# Patient Record
Sex: MALE | Race: WHITE | URBAN - METROPOLITAN AREA
[De-identification: names, ages, dates, MRNs, and addresses within clinical notes are randomized per-mention and may not be internally consistent; named-entity substitution may affect disease eponyms.]

---

## 2023-03-17 DIAGNOSIS — M10.9 GOUT, UNSPECIFIED CAUSE, UNSPECIFIED CHRONICITY, UNSPECIFIED SITE: ICD-10-CM

## 2023-03-19 PROBLEM — M10.9 GOUT: Status: ACTIVE | Noted: 2023-03-19

## 2023-03-20 RX ORDER — COLCHICINE 0.6 MG/1
TABLET ORAL
Qty: 90 TABLET | Refills: 0 | Status: SHIPPED | OUTPATIENT
Start: 2023-03-20 | End: 2023-03-20 | Stop reason: SDUPTHER

## 2023-03-20 RX ORDER — COLCHICINE 0.6 MG/1
0.6 TABLET ORAL DAILY
Qty: 90 TABLET | Refills: 1 | Status: SHIPPED | OUTPATIENT
Start: 2023-03-20 | End: 2024-02-06

## 2023-05-09 ENCOUNTER — LAB (OUTPATIENT)
Dept: LAB | Facility: LAB | Age: 63
End: 2023-05-09
Payer: COMMERCIAL

## 2023-05-09 ENCOUNTER — OFFICE VISIT (OUTPATIENT)
Dept: PRIMARY CARE | Facility: CLINIC | Age: 63
End: 2023-05-09
Payer: COMMERCIAL

## 2023-05-09 VITALS
SYSTOLIC BLOOD PRESSURE: 129 MMHG | OXYGEN SATURATION: 98 % | HEIGHT: 70 IN | WEIGHT: 166 LBS | TEMPERATURE: 97.4 F | BODY MASS INDEX: 23.77 KG/M2 | DIASTOLIC BLOOD PRESSURE: 81 MMHG | HEART RATE: 66 BPM

## 2023-05-09 DIAGNOSIS — M10.9 GOUT, UNSPECIFIED CAUSE, UNSPECIFIED CHRONICITY, UNSPECIFIED SITE: ICD-10-CM

## 2023-05-09 DIAGNOSIS — Z00.00 VISIT FOR PREVENTIVE HEALTH EXAMINATION: Primary | ICD-10-CM

## 2023-05-09 DIAGNOSIS — Z12.11 SCREENING FOR MALIGNANT NEOPLASM OF COLON: ICD-10-CM

## 2023-05-09 DIAGNOSIS — Z00.00 VISIT FOR PREVENTIVE HEALTH EXAMINATION: ICD-10-CM

## 2023-05-09 LAB
ALANINE AMINOTRANSFERASE (SGPT) (U/L) IN SER/PLAS: 21 U/L (ref 10–52)
ALBUMIN (G/DL) IN SER/PLAS: 4.4 G/DL (ref 3.4–5)
ALKALINE PHOSPHATASE (U/L) IN SER/PLAS: 59 U/L (ref 33–136)
ANION GAP IN SER/PLAS: 14 MMOL/L (ref 10–20)
ASPARTATE AMINOTRANSFERASE (SGOT) (U/L) IN SER/PLAS: 24 U/L (ref 9–39)
BILIRUBIN TOTAL (MG/DL) IN SER/PLAS: 1.4 MG/DL (ref 0–1.2)
CALCIUM (MG/DL) IN SER/PLAS: 9.7 MG/DL (ref 8.6–10.6)
CARBON DIOXIDE, TOTAL (MMOL/L) IN SER/PLAS: 30 MMOL/L (ref 21–32)
CHLORIDE (MMOL/L) IN SER/PLAS: 104 MMOL/L (ref 98–107)
CHOLESTEROL (MG/DL) IN SER/PLAS: 194 MG/DL (ref 0–199)
CHOLESTEROL IN HDL (MG/DL) IN SER/PLAS: 49 MG/DL
CHOLESTEROL/HDL RATIO: 4
CREATININE (MG/DL) IN SER/PLAS: 1.18 MG/DL (ref 0.5–1.3)
ERYTHROCYTE DISTRIBUTION WIDTH (RATIO) BY AUTOMATED COUNT: 12.6 % (ref 11.5–14.5)
ERYTHROCYTE MEAN CORPUSCULAR HEMOGLOBIN CONCENTRATION (G/DL) BY AUTOMATED: 33 G/DL (ref 32–36)
ERYTHROCYTE MEAN CORPUSCULAR VOLUME (FL) BY AUTOMATED COUNT: 93 FL (ref 80–100)
ERYTHROCYTES (10*6/UL) IN BLOOD BY AUTOMATED COUNT: 4.92 X10E12/L (ref 4.5–5.9)
GFR MALE: 69 ML/MIN/1.73M2
GLUCOSE (MG/DL) IN SER/PLAS: 84 MG/DL (ref 74–99)
HEMATOCRIT (%) IN BLOOD BY AUTOMATED COUNT: 46 % (ref 41–52)
HEMOGLOBIN (G/DL) IN BLOOD: 15.2 G/DL (ref 13.5–17.5)
LDL: 119 MG/DL (ref 0–99)
LEUKOCYTES (10*3/UL) IN BLOOD BY AUTOMATED COUNT: 5.9 X10E9/L (ref 4.4–11.3)
NRBC (PER 100 WBCS) BY AUTOMATED COUNT: 0 /100 WBC (ref 0–0)
PLATELETS (10*3/UL) IN BLOOD AUTOMATED COUNT: 212 X10E9/L (ref 150–450)
POTASSIUM (MMOL/L) IN SER/PLAS: 4.4 MMOL/L (ref 3.5–5.3)
PROSTATE SPECIFIC ANTIGEN,SCREEN: 0.49 NG/ML (ref 0–4)
PROTEIN TOTAL: 6.6 G/DL (ref 6.4–8.2)
RBC, URINE: 1 /HPF (ref 0–5)
SODIUM (MMOL/L) IN SER/PLAS: 144 MMOL/L (ref 136–145)
THYROTROPIN (MIU/L) IN SER/PLAS BY DETECTION LIMIT <= 0.05 MIU/L: 3.62 MIU/L (ref 0.44–3.98)
TRIGLYCERIDE (MG/DL) IN SER/PLAS: 132 MG/DL (ref 0–149)
URATE (MG/DL) IN SER/PLAS: 7.4 MG/DL (ref 4–7.5)
UREA NITROGEN (MG/DL) IN SER/PLAS: 19 MG/DL (ref 6–23)
VLDL: 26 MG/DL (ref 0–40)
WBC, URINE: <1 /HPF (ref 0–5)

## 2023-05-09 PROCEDURE — 99213 OFFICE O/P EST LOW 20 MIN: CPT | Performed by: FAMILY MEDICINE

## 2023-05-09 PROCEDURE — 84443 ASSAY THYROID STIM HORMONE: CPT

## 2023-05-09 PROCEDURE — 99396 PREV VISIT EST AGE 40-64: CPT | Performed by: FAMILY MEDICINE

## 2023-05-09 PROCEDURE — 36415 COLL VENOUS BLD VENIPUNCTURE: CPT

## 2023-05-09 PROCEDURE — 80053 COMPREHEN METABOLIC PANEL: CPT

## 2023-05-09 PROCEDURE — 84550 ASSAY OF BLOOD/URIC ACID: CPT

## 2023-05-09 PROCEDURE — 85027 COMPLETE CBC AUTOMATED: CPT

## 2023-05-09 PROCEDURE — 84153 ASSAY OF PSA TOTAL: CPT

## 2023-05-09 PROCEDURE — 80061 LIPID PANEL: CPT

## 2023-05-09 PROCEDURE — 1036F TOBACCO NON-USER: CPT | Performed by: FAMILY MEDICINE

## 2023-05-09 PROCEDURE — 81001 URINALYSIS AUTO W/SCOPE: CPT

## 2023-05-09 RX ORDER — INDOMETHACIN 25 MG/1
1 CAPSULE ORAL 3 TIMES DAILY
COMMUNITY
Start: 2022-06-01

## 2023-05-09 RX ORDER — PREDNISONE 20 MG/1
20 TABLET ORAL DAILY
Qty: 10 TABLET | Refills: 1 | Status: SHIPPED | OUTPATIENT
Start: 2023-05-09 | End: 2023-05-19

## 2023-05-09 RX ORDER — MELATONIN 3 MG
CAPSULE ORAL
COMMUNITY

## 2023-05-09 ASSESSMENT — LIFESTYLE VARIABLES
HOW OFTEN DO YOU HAVE A DRINK CONTAINING ALCOHOL: NEVER
SKIP TO QUESTIONS 9-10: 1
AUDIT-C TOTAL SCORE: 0
HOW MANY STANDARD DRINKS CONTAINING ALCOHOL DO YOU HAVE ON A TYPICAL DAY: PATIENT DOES NOT DRINK
HOW OFTEN DO YOU HAVE SIX OR MORE DRINKS ON ONE OCCASION: NEVER

## 2023-05-09 NOTE — PROGRESS NOTES
"Subjective   Patient ID: Rico Peng is a 62 y.o. male who presents for Gout and Insomnia.    Assessment/Plan     Problem List Items Addressed This Visit          Other    Gout    Relevant Medications    predniSONE (Deltasone) 20 mg tablet    Other Relevant Orders    Uric acid    Visit for preventive health examination - Primary    Relevant Orders    Prostate Specific Antigen, Screen    Urinalysis Microscopic Only    TSH with reflex to Free T4 if abnormal    Lipid Panel    Comprehensive Metabolic Panel    CBC    Uric acid    CT cardiac scoring wo IV contrast     Other Visit Diagnoses       Screening for malignant neoplasm of colon        Relevant Orders    Cologuard® colon cancer screening            discussed about Diet exercise  Colonoscopy declined -Cologuard negative in December 2018  Cologuard today  Discussed about shingles vaccine  Calcium score ordered today  Follow-up every year for physical. Come back early with any new sinus symptoms. Patient understands and in agreement with plan.   HPI    62-year-old male here here for physical complaining of sleep disturbance  Taking melatonin helping    Gout on colchicine    No significant past medical history surgical history no smoking alcohol or drug use    Doing routine exercise without any trouble    No Known Allergies    Current Outpatient Medications   Medication Sig Dispense Refill    colchicine 0.6 mg tablet Take 1 tablet (0.6 mg) by mouth once daily. 90 tablet 1    melatonin 3 mg capsule Take by mouth.      indomethacin (Indocin) 25 mg capsule Take 1 capsule (25 mg) by mouth 3 times a day.      predniSONE (Deltasone) 20 mg tablet Take 1 tablet (20 mg) by mouth once daily for 10 days. 10 tablet 1     No current facility-administered medications for this visit.       Objective   Visit Vitals  /81 (BP Location: Left arm, Patient Position: Sitting)   Pulse 66   Temp 36.3 °C (97.4 °F)   Ht 1.778 m (5' 10\")   Wt 75.3 kg (166 lb)   SpO2 98%   BMI 23.82 " kg/m²   Smoking Status Never   BSA 1.93 m²     Physical Exam    Constitutional   General appearance: Alert and in no acute distress.   Head and Face   Head and face: Normal.     Palpation of the face and sinuses: Normal.    Eyes   Inspection of eyes: Sclera and conjunctiva were normal.    Pupil exam: Pupils were equal in size. Extraocular movements were intact.   Ears, Nose, Mouth, and Throat   Ears: Auricles: Normal.    Otoscopic examination: Tympanic membranes: Normal with no congestion and no discharge. Otic Canals: Normal without tenderness, congestion or discharge.    Hearing: Normal.     Nasal mucosa, septum, and turbinates: Normal without edema or erythema.    Lips, teeth, and gums: Normal.    Oropharynx: Normal with moist mucus membranes, no congestion. Tonsils: Normal no follicles.   Neck   Neck Exam: Appearance of the neck was normal. No neck masses observed.    Thyroid exam: Not enlarged and no palpable thyroid nodules.   Pulmonary   Respiratory assessment: No respiratory distress, normal respiratory rhythm and effort.    Auscultation of Lungs: Clear bilateral breath sounds.   Cardiovascular   Auscultation of heart: Apical pulse normal, heart rate and rhythm normal, normal S1 and S2, no murmurs and no pericardial rub.    Carotid arteries: Pulses normal with no bruits.    Exam for edema: No peripheral edema.   Chest   Chest: Normal A_P diameter, no pulsation, no intercostal withdrawing. Trachea central.   Abdomen   Abdominal Exam: No bruits, normal bowel sounds, soft, non-tender, no abdominal mass palpated.    Liver and Spleen exam: No hepato-splenomegaly.    Examination for hernias: Normal.    Lymphatic   Palpation of lymph nodes in neck: No cervical lymphadenopathy.   Musculoskeletal   Examination of gait: Normal.    Inspection of digits and nails: No clubbing or cyanosis of the fingernails.    Inspection/palpation of joints, bones and muscles: No joint swelling. Normal movement of all extremities.     Range of Motion: Normal movement of all extremities.   Skin   Skin inspection: Normal skin color and pigmentation, normal skin turgor and no visible rash.   Neurologic   Cranial nerves: Nerves 2-12 were intact, no focal neuro defects.    Reflexes: Normal.     Sensation: Normal.     Coordination: Normal.    Psychiatric   Judgment and insight: Intact.    Orientation: Oriented to person, place, and time.    Recent and remote memory: Normal.     Mood and affect: Normal.     Genitourinary Declined.    Immunization History   Administered Date(s) Administered    Pfizer Purple Cap SARS-CoV-2 04/02/2021, 04/23/2021       Review of Systems    No visits with results within 4 Month(s) from this visit.   Latest known visit with results is:   Legacy Encounter on 11/16/2018   Component Date Value Ref Range Status    WBC 11/16/2018 5.4  4.4 - 11.3 x10E9/L Final    nRBC 11/16/2018 0.0  0.0 - 0.0 /100 WBC Final    RBC 11/16/2018 5.14  4.50 - 5.90 x10E12/L Final    Hemoglobin 11/16/2018 16.2  13.5 - 17.5 g/dL Final    Hematocrit 11/16/2018 47.0  41.0 - 52.0 % Final    MCV 11/16/2018 91  80 - 100 fL Final    MCHC 11/16/2018 34.5  32.0 - 36.0 g/dL Final    Platelets 11/16/2018 208  150 - 450 x10E9/L Final    RDW 11/16/2018 12.2  11.5 - 14.5 % Final    Vitamin D, 25-Hydroxy 11/16/2018 23 (A)  ng/mL Final    Uric Acid 11/16/2018 8.6 (H)  4.0 - 7.5 mg/dL Final    Color, Urine 11/16/2018 YELLOW  STRAW,YELLOW Final    Appearance, Urine 11/16/2018 CLEAR  CLEAR Final    Specific Gravity, Urine 11/16/2018 1.009  1.005 - 1.035 Final    pH, Urine 11/16/2018 6.0  5.0 - 8.0 Final    Protein, Urine 11/16/2018 NEGATIVE  NEGATIVE mg/dL Final    Glucose, Urine 11/16/2018 NEGATIVE  NEGATIVE mg/dL Final    Blood, Urine 11/16/2018 NEGATIVE  NEGATIVE Final    Ketones, Urine 11/16/2018 NEGATIVE  NEGATIVE mg/dL Final    Bilirubin, Urine 11/16/2018 NEGATIVE  NEGATIVE Final    Urobilinogen, Urine 11/16/2018 <2.0  0.0 - 1.9 mg/dL Final    Nitrite, Urine  11/16/2018 NEGATIVE  NEGATIVE Final    Leukocyte Esterase, Urine 11/16/2018 NEGATIVE  NEGATIVE Final    Glucose 11/16/2018 91  74 - 99 mg/dL Final    Sodium 11/16/2018 139  136 - 145 mmol/L Final    Potassium 11/16/2018 4.6  3.5 - 5.3 mmol/L Final    Chloride 11/16/2018 101  98 - 107 mmol/L Final    Bicarbonate 11/16/2018 30  21 - 32 mmol/L Final    Anion Gap 11/16/2018 13  10 - 20 mmol/L Final    Urea Nitrogen 11/16/2018 16  6 - 23 mg/dL Final    Creatinine 11/16/2018 1.16  0.50 - 1.30 mg/dL Final    GLOMERULAR FILTRATION RATE-NON AFR* 11/16/2018 >60  >60 mL/min/1.73m2 Final    GLOMERULAR FILTRATION RATE-* 11/16/2018 >60  >60 mL/min/1.73m2 Final    Calcium 11/16/2018 9.9  8.6 - 10.6 mg/dL Final    Albumin 11/16/2018 4.6  3.4 - 5.0 g/dL Final    Alkaline Phosphatase 11/16/2018 63  33 - 120 U/L Final    Total Protein 11/16/2018 6.7  6.4 - 8.2 g/dL Final    AST 11/16/2018 19  9 - 39 U/L Final    Total Bilirubin 11/16/2018 0.7  0.0 - 1.2 mg/dL Final    ALT (SGPT) 11/16/2018 17  10 - 52 U/L Final    PSA 11/16/2018 0.55  0.00 - 4.00 ng/mL Final    Cholesterol 11/16/2018 229 (H)  0 - 199 mg/dL Final    HDL 11/16/2018 44.9  mg/dL Final    Cholesterol/HDL Ratio 11/16/2018 5.1 (A)   Final    LDL 11/16/2018 139 (H)  0 - 99 mg/dL Final    VLDL 11/16/2018 45 (H)  0 - 40 mg/dL Final    Triglycerides 11/16/2018 226 (H)  0 - 149 mg/dL Final    Non HDL Cholesterol 11/16/2018 184  mg/dL Final    TSH 11/16/2018 2.51  0.44 - 3.98 mIU/L Final       Radiology: Reviewed imaging in powerchart.  No results found.    No family history on file.  Social History     Socioeconomic History    Marital status:      Spouse name: None    Number of children: None    Years of education: None    Highest education level: None   Occupational History    None   Tobacco Use    Smoking status: Never    Smokeless tobacco: Never   Vaping Use    Vaping status: None   Substance and Sexual Activity    Alcohol use: Never    Drug use: Never    Sexual  activity: None   Other Topics Concern    None   Social History Narrative    None     Social Determinants of Health     Financial Resource Strain: Not on file   Food Insecurity: Not on file   Transportation Needs: Not on file   Physical Activity: Not on file   Stress: Not on file   Social Connections: Not on file   Intimate Partner Violence: Not on file   Housing Stability: Not on file     Past Medical History:   Diagnosis Date    Other conditions influencing health status     No significant past surgical history    Other specified soft tissue disorders 08/19/2015    Foot swelling    Pain in unspecified foot 08/19/2015    Foot pain     History reviewed. No pertinent surgical history.    Charting was completed using voice recognition technology and may include unintended errors.

## 2023-05-10 ENCOUNTER — TELEPHONE (OUTPATIENT)
Dept: PRIMARY CARE | Facility: CLINIC | Age: 63
End: 2023-05-10

## 2023-05-10 NOTE — TELEPHONE ENCOUNTER
----- Message from Kenji Cox MD sent at 5/10/2023  9:47 AM EDT -----  Please call the patient regarding his abnormal result.  Patient's cholesterol has improved but still high we will wait for CT calcium score and see whether patient needs to be on statin.  Meanwhile continue low-carb diet physical activity

## 2023-05-25 ENCOUNTER — TELEPHONE (OUTPATIENT)
Dept: PRIMARY CARE | Facility: CLINIC | Age: 63
End: 2023-05-25
Payer: COMMERCIAL

## 2023-11-28 ENCOUNTER — APPOINTMENT (OUTPATIENT)
Dept: PRIMARY CARE | Facility: CLINIC | Age: 63
End: 2023-11-28
Payer: COMMERCIAL

## 2023-11-29 ENCOUNTER — APPOINTMENT (OUTPATIENT)
Dept: PRIMARY CARE | Facility: CLINIC | Age: 63
End: 2023-11-29
Payer: COMMERCIAL

## 2024-02-01 DIAGNOSIS — M10.9 GOUT, UNSPECIFIED CAUSE, UNSPECIFIED CHRONICITY, UNSPECIFIED SITE: ICD-10-CM

## 2024-02-06 DIAGNOSIS — M10.9 GOUT, UNSPECIFIED CAUSE, UNSPECIFIED CHRONICITY, UNSPECIFIED SITE: ICD-10-CM

## 2024-02-06 RX ORDER — COLCHICINE 0.6 MG/1
0.6 TABLET ORAL DAILY
Qty: 90 TABLET | Refills: 0 | Status: SHIPPED | OUTPATIENT
Start: 2024-02-06 | End: 2024-02-09 | Stop reason: SDUPTHER

## 2024-02-07 RX ORDER — COLCHICINE 0.6 MG/1
0.6 TABLET ORAL DAILY
Qty: 90 TABLET | Refills: 0 | OUTPATIENT
Start: 2024-02-07

## 2024-02-09 RX ORDER — COLCHICINE 0.6 MG/1
0.6 TABLET ORAL DAILY
Qty: 90 TABLET | Refills: 1 | Status: SHIPPED | OUTPATIENT
Start: 2024-02-09 | End: 2024-02-12 | Stop reason: SDUPTHER

## 2024-02-12 ENCOUNTER — TELEPHONE (OUTPATIENT)
Dept: PRIMARY CARE | Facility: CLINIC | Age: 64
End: 2024-02-12
Payer: COMMERCIAL

## 2024-02-12 DIAGNOSIS — M10.9 GOUT, UNSPECIFIED CAUSE, UNSPECIFIED CHRONICITY, UNSPECIFIED SITE: ICD-10-CM

## 2024-02-12 RX ORDER — COLCHICINE 0.6 MG/1
0.6 TABLET ORAL DAILY
Qty: 90 TABLET | Refills: 1 | Status: SHIPPED | OUTPATIENT
Start: 2024-02-12

## 2024-02-12 NOTE — TELEPHONE ENCOUNTER
LEFT COLCHICINE IN SONS CAR IN [place]. SOMEHOW IT DISAPPEARED. NEEDS TO HAVE A REFILL.     [facility]

## 2024-12-04 DIAGNOSIS — M10.9 GOUT, UNSPECIFIED CAUSE, UNSPECIFIED CHRONICITY, UNSPECIFIED SITE: ICD-10-CM

## 2024-12-05 RX ORDER — COLCHICINE 0.6 MG/1
0.6 TABLET ORAL DAILY
Qty: 30 TABLET | Refills: 0 | Status: SHIPPED | OUTPATIENT
Start: 2024-12-05 | End: 2025-01-04

## 2025-01-16 DIAGNOSIS — M10.9 GOUT, UNSPECIFIED CAUSE, UNSPECIFIED CHRONICITY, UNSPECIFIED SITE: ICD-10-CM

## 2025-01-20 RX ORDER — COLCHICINE 0.6 MG/1
0.6 TABLET ORAL DAILY
Qty: 90 TABLET | Refills: 2 | Status: SHIPPED | OUTPATIENT
Start: 2025-01-20 | End: 2026-01-20

## 2025-01-21 ENCOUNTER — TELEPHONE (OUTPATIENT)
Dept: PRIMARY CARE | Facility: CLINIC | Age: 65
End: 2025-01-21
Payer: COMMERCIAL

## 2025-01-21 DIAGNOSIS — M10.9 GOUT, UNSPECIFIED CAUSE, UNSPECIFIED CHRONICITY, UNSPECIFIED SITE: ICD-10-CM

## 2025-01-21 RX ORDER — COLCHICINE 0.6 MG/1
0.6 TABLET ORAL DAILY
Qty: 30 TABLET | Refills: 0 | OUTPATIENT
Start: 2025-01-21

## 2025-01-24 NOTE — TELEPHONE ENCOUNTER
Please send colchcine  to [facility]. Patient can't use [facility]    Patient is asking for 90 days of med

## 2025-01-27 RX ORDER — COLCHICINE 0.6 MG/1
0.6 TABLET ORAL DAILY
Qty: 14 TABLET | Refills: 0 | Status: SHIPPED | OUTPATIENT
Start: 2025-01-27 | End: 2025-02-10

## 2025-01-27 NOTE — TELEPHONE ENCOUNTER
PT has been waiting for this med because of gout.  I see it is up for the Dr to sign off.  PT was told to make an appointment and he did so.      Please get this med sent today to [facility].

## 2025-02-07 ENCOUNTER — APPOINTMENT (OUTPATIENT)
Dept: PRIMARY CARE | Facility: CLINIC | Age: 65
End: 2025-02-07
Payer: COMMERCIAL

## 2025-02-07 VITALS
DIASTOLIC BLOOD PRESSURE: 62 MMHG | TEMPERATURE: 98.2 F | SYSTOLIC BLOOD PRESSURE: 111 MMHG | BODY MASS INDEX: 24.48 KG/M2 | OXYGEN SATURATION: 93 % | HEART RATE: 73 BPM | WEIGHT: 171 LBS | HEIGHT: 70 IN

## 2025-02-07 DIAGNOSIS — Z12.11 SCREENING FOR MALIGNANT NEOPLASM OF COLON: ICD-10-CM

## 2025-02-07 DIAGNOSIS — M10.9 GOUT, UNSPECIFIED CAUSE, UNSPECIFIED CHRONICITY, UNSPECIFIED SITE: ICD-10-CM

## 2025-02-07 DIAGNOSIS — Z00.00 VISIT FOR PREVENTIVE HEALTH EXAMINATION: Primary | ICD-10-CM

## 2025-02-07 PROCEDURE — 90471 IMMUNIZATION ADMIN: CPT | Performed by: FAMILY MEDICINE

## 2025-02-07 PROCEDURE — 3008F BODY MASS INDEX DOCD: CPT | Performed by: FAMILY MEDICINE

## 2025-02-07 PROCEDURE — 1160F RVW MEDS BY RX/DR IN RCRD: CPT | Performed by: FAMILY MEDICINE

## 2025-02-07 PROCEDURE — 90677 PCV20 VACCINE IM: CPT | Performed by: FAMILY MEDICINE

## 2025-02-07 PROCEDURE — 1036F TOBACCO NON-USER: CPT | Performed by: FAMILY MEDICINE

## 2025-02-07 PROCEDURE — 1159F MED LIST DOCD IN RCRD: CPT | Performed by: FAMILY MEDICINE

## 2025-02-07 PROCEDURE — 99397 PER PM REEVAL EST PAT 65+ YR: CPT | Performed by: FAMILY MEDICINE

## 2025-02-07 RX ORDER — VIT C/E/ZN/COPPR/LUTEIN/ZEAXAN 250MG-90MG
CAPSULE ORAL
COMMUNITY
Start: 2023-06-07

## 2025-02-07 RX ORDER — COLCHICINE 0.6 MG/1
0.6 TABLET ORAL DAILY
Qty: 90 TABLET | Refills: 3 | Status: SHIPPED | OUTPATIENT
Start: 2025-02-07 | End: 2026-02-07

## 2025-02-07 NOTE — PROGRESS NOTES
Subjective   Patient ID: Rico Peng is a 64 y.o. male who presents for Annual Exam (Pt is fasting /Declined flu shot).    Assessment/Plan     Problem List Items Addressed This Visit       Gout    Relevant Medications    colchicine 0.6 mg tablet    Other Relevant Orders    Uric acid    Visit for preventive health examination - Primary    Relevant Orders    Prostate Specific Antigen, Screen    TSH with reflex to Free T4 if abnormal    Lipid Panel    Comprehensive Metabolic Panel    CBC    Urinalysis with Reflex Microscopic    Cologuard® colon cancer screening    Pneumococcal conjugate vaccine, 20-valent (PREVNAR 20) (Completed)     Other Visit Diagnoses       Screening for malignant neoplasm of colon        Relevant Orders    Cologuard® colon cancer screening          Prevnar 20 today  discussed about Diet exercise  Colonoscopy declined -Cologuard negative in December 2018  Cologuard today  Discussed about shingles vaccine  Follow-up every year for physical. Come back early with any new sinus symptoms. Patient understands and in agreement with plan.       HPI    64-year-old male here here for physical     No new sudden symptoms  Fasting for lab work today      Gout on colchicine    No significant past medical history surgical history no smoking alcohol or drug use    Doing routine exercise without any trouble    No Known Allergies    Current Outpatient Medications   Medication Sig Dispense Refill   • cholecalciferol (Vitamin D-3) 125 mcg (5000 UT) capsule Take by mouth.     • melatonin 3 mg capsule Take by mouth.     • colchicine 0.6 mg tablet Take 1 tablet (0.6 mg) by mouth once daily. 90 tablet 3   • indomethacin (Indocin) 25 mg capsule Take 1 capsule (25 mg) by mouth 3 times a day. (Patient not taking: Reported on 2/7/2025)       No current facility-administered medications for this visit.       Objective   Visit Vitals  /62 (BP Location: Left arm, Patient Position: Sitting)   Pulse 73   Temp 36.8 °C  "(98.2 °F)   Ht 1.778 m (5' 10\")   Wt 77.6 kg (171 lb)   SpO2 93%   BMI 24.54 kg/m²   Smoking Status Never   BSA 1.96 m²     Physical Exam    Constitutional   General appearance: Alert and in no acute distress.   Head and Face   Head and face: Normal.     Palpation of the face and sinuses: Normal.    Eyes   Inspection of eyes: Sclera and conjunctiva were normal.    Pupil exam: Pupils were equal in size. Extraocular movements were intact.   Ears, Nose, Mouth, and Throat   Ears: Auricles: Normal.    Otoscopic examination: Tympanic membranes: Normal with no congestion and no discharge. Otic Canals: Normal without tenderness, congestion or discharge.    Hearing: Normal.     Nasal mucosa, septum, and turbinates: Normal without edema or erythema.    Lips, teeth, and gums: Normal.    Oropharynx: Normal with moist mucus membranes, no congestion. Tonsils: Normal no follicles.   Neck   Neck Exam: Appearance of the neck was normal. No neck masses observed.    Thyroid exam: Not enlarged and no palpable thyroid nodules.   Pulmonary   Respiratory assessment: No respiratory distress, normal respiratory rhythm and effort.    Auscultation of Lungs: Clear bilateral breath sounds.   Cardiovascular   Auscultation of heart: Apical pulse normal, heart rate and rhythm normal, normal S1 and S2, no murmurs and no pericardial rub.    Carotid arteries: Pulses normal with no bruits.    Exam for edema: No peripheral edema.   Chest   Chest: Normal A_P diameter, no pulsation, no intercostal withdrawing. Trachea central.   Abdomen   Abdominal Exam: No bruits, normal bowel sounds, soft, non-tender, no abdominal mass palpated.    Liver and Spleen exam: No hepato-splenomegaly.    Examination for hernias: Normal.    Lymphatic   Palpation of lymph nodes in neck: No cervical lymphadenopathy.   Musculoskeletal   Examination of gait: Normal.    Inspection of digits and nails: No clubbing or cyanosis of the fingernails.    Inspection/palpation of joints, " bones and muscles: No joint swelling. Normal movement of all extremities.    Range of Motion: Normal movement of all extremities.   Skin   Skin inspection: Normal skin color and pigmentation, normal skin turgor and no visible rash.   Neurologic   Cranial nerves: Nerves 2-12 were intact, no focal neuro defects.    Reflexes: Normal.     Sensation: Normal.     Coordination: Normal.    Psychiatric   Judgment and insight: Intact.    Orientation: Oriented to person, place, and time.    Recent and remote memory: Normal.     Mood and affect: Normal.     Genitourinary Declined.    Immunization History   Administered Date(s) Administered   • COVID-19, mRNA, LNP-S, PF, 30 mcg/0.3 mL dose 04/02/2021, 04/23/2021   • Pfizer COVID-19 vaccine, bivalent, age 12 years and older (30 mcg/0.3 mL) 09/15/2022   • Pneumococcal conjugate vaccine, 20-valent (PREVNAR 20) 02/07/2025       Review of Systems    No visits with results within 4 Month(s) from this visit.   Latest known visit with results is:   Lab on 05/09/2023   Component Date Value Ref Range Status   • Prostate Specific Antigen,Screen 05/09/2023 0.49  0.00 - 4.00 ng/mL Final   • WBC, Urine 05/09/2023 <1  0 - 5 /HPF Final   • RBC, Urine 05/09/2023 1  0 - 5 /HPF Final   • TSH 05/09/2023 3.62  0.44 - 3.98 mIU/L Final   • Cholesterol 05/09/2023 194  0 - 199 mg/dL Final   • HDL 05/09/2023 49.0  mg/dL Final   • Cholesterol/HDL Ratio 05/09/2023 4.0   Final   • LDL 05/09/2023 119 (H)  0 - 99 mg/dL Final   • VLDL 05/09/2023 26  0 - 40 mg/dL Final   • Triglycerides 05/09/2023 132  0 - 149 mg/dL Final   • Glucose 05/09/2023 84  74 - 99 mg/dL Final   • Sodium 05/09/2023 144  136 - 145 mmol/L Final   • Potassium 05/09/2023 4.4  3.5 - 5.3 mmol/L Final   • Chloride 05/09/2023 104  98 - 107 mmol/L Final   • Bicarbonate 05/09/2023 30  21 - 32 mmol/L Final   • Anion Gap 05/09/2023 14  10 - 20 mmol/L Final   • Urea Nitrogen 05/09/2023 19  6 - 23 mg/dL Final   • Creatinine 05/09/2023 1.18  0.50 -  1.30 mg/dL Final   • GFR MALE 05/09/2023 69  >90 mL/min/1.73m2 Final   • Calcium 05/09/2023 9.7  8.6 - 10.6 mg/dL Final   • Albumin 05/09/2023 4.4  3.4 - 5.0 g/dL Final   • Alkaline Phosphatase 05/09/2023 59  33 - 136 U/L Final   • Total Protein 05/09/2023 6.6  6.4 - 8.2 g/dL Final   • AST 05/09/2023 24  9 - 39 U/L Final   • Total Bilirubin 05/09/2023 1.4 (H)  0.0 - 1.2 mg/dL Final   • ALT (SGPT) 05/09/2023 21  10 - 52 U/L Final   • WBC 05/09/2023 5.9  4.4 - 11.3 x10E9/L Final   • nRBC 05/09/2023 0.0  0.0 - 0.0 /100 WBC Final   • RBC 05/09/2023 4.92  4.50 - 5.90 x10E12/L Final   • Hemoglobin 05/09/2023 15.2  13.5 - 17.5 g/dL Final   • Hematocrit 05/09/2023 46.0  41.0 - 52.0 % Final   • MCV 05/09/2023 93  80 - 100 fL Final   • MCHC 05/09/2023 33.0  32.0 - 36.0 g/dL Final   • Platelets 05/09/2023 212  150 - 450 x10E9/L Final   • RDW 05/09/2023 12.6  11.5 - 14.5 % Final   • Uric Acid 05/09/2023 7.4  4.0 - 7.5 mg/dL Final       Radiology: Reviewed imaging in powerchart.  No results found.    No family history on file.  Social History     Socioeconomic History   • Marital status:    Tobacco Use   • Smoking status: Never   • Smokeless tobacco: Never   Substance and Sexual Activity   • Alcohol use: Never   • Drug use: Never     Past Medical History:   Diagnosis Date   • Other conditions influencing health status     No significant past surgical history   • Other specified soft tissue disorders 08/19/2015    Foot swelling   • Pain in unspecified foot 08/19/2015    Foot pain     History reviewed. No pertinent surgical history.    Charting was completed using voice recognition technology and may include unintended errors.

## 2025-02-08 LAB
ALBUMIN SERPL-MCNC: 4.6 G/DL (ref 3.6–5.1)
ALP SERPL-CCNC: 59 U/L (ref 35–144)
ALT SERPL-CCNC: 22 U/L (ref 9–46)
ANION GAP SERPL CALCULATED.4IONS-SCNC: 7 MMOL/L (CALC) (ref 7–17)
APPEARANCE UR: CLEAR
AST SERPL-CCNC: 23 U/L (ref 10–35)
BILIRUB SERPL-MCNC: 0.9 MG/DL (ref 0.2–1.2)
BILIRUB UR QL STRIP: NEGATIVE
BUN SERPL-MCNC: 19 MG/DL (ref 7–25)
CALCIUM SERPL-MCNC: 9.7 MG/DL (ref 8.6–10.3)
CHLORIDE SERPL-SCNC: 104 MMOL/L (ref 98–110)
CHOLEST SERPL-MCNC: 248 MG/DL
CHOLEST/HDLC SERPL: 5.1 (CALC)
CO2 SERPL-SCNC: 30 MMOL/L (ref 20–32)
COLOR UR: YELLOW
CREAT SERPL-MCNC: 1.28 MG/DL (ref 0.7–1.35)
EGFRCR SERPLBLD CKD-EPI 2021: 62 ML/MIN/1.73M2
ERYTHROCYTE [DISTWIDTH] IN BLOOD BY AUTOMATED COUNT: 12.4 % (ref 11–15)
GLUCOSE SERPL-MCNC: 94 MG/DL (ref 65–99)
GLUCOSE UR QL STRIP: NEGATIVE
HCT VFR BLD AUTO: 48.7 % (ref 38.5–50)
HDLC SERPL-MCNC: 49 MG/DL
HGB BLD-MCNC: 17.1 G/DL (ref 13.2–17.1)
HGB UR QL STRIP: NEGATIVE
KETONES UR QL STRIP: NEGATIVE
LDLC SERPL CALC-MCNC: 171 MG/DL (CALC)
LEUKOCYTE ESTERASE UR QL STRIP: NEGATIVE
MCH RBC QN AUTO: 31.7 PG (ref 27–33)
MCHC RBC AUTO-ENTMCNC: 35.1 G/DL (ref 32–36)
MCV RBC AUTO: 90.4 FL (ref 80–100)
NITRITE UR QL STRIP: NEGATIVE
NONHDLC SERPL-MCNC: 199 MG/DL (CALC)
PH UR STRIP: 6.5 [PH] (ref 5–8)
PLATELET # BLD AUTO: 229 THOUSAND/UL (ref 140–400)
PMV BLD REES-ECKER: 11.2 FL (ref 7.5–12.5)
POTASSIUM SERPL-SCNC: 4.3 MMOL/L (ref 3.5–5.3)
PROT SERPL-MCNC: 6.7 G/DL (ref 6.1–8.1)
PROT UR QL STRIP: NEGATIVE
PSA SERPL-MCNC: 0.48 NG/ML
RBC # BLD AUTO: 5.39 MILLION/UL (ref 4.2–5.8)
SODIUM SERPL-SCNC: 141 MMOL/L (ref 135–146)
SP GR UR STRIP: 1.01 (ref 1–1.03)
T4 FREE SERPL-MCNC: 1.1 NG/DL (ref 0.8–1.8)
TRIGL SERPL-MCNC: 141 MG/DL
TSH SERPL-ACNC: 4.93 MIU/L (ref 0.4–4.5)
URATE SERPL-MCNC: 9.9 MG/DL (ref 4–8)
WBC # BLD AUTO: 6.7 THOUSAND/UL (ref 3.8–10.8)

## 2025-02-20 ENCOUNTER — APPOINTMENT (OUTPATIENT)
Dept: PRIMARY CARE | Facility: CLINIC | Age: 65
End: 2025-02-20
Payer: COMMERCIAL

## 2025-02-20 VITALS — HEIGHT: 70 IN | WEIGHT: 165 LBS | BODY MASS INDEX: 23.62 KG/M2

## 2025-02-20 DIAGNOSIS — E78.2 MIXED HYPERLIPIDEMIA: ICD-10-CM

## 2025-02-20 DIAGNOSIS — E79.0 HYPERURICEMIA: ICD-10-CM

## 2025-02-20 DIAGNOSIS — M10.9 GOUT, UNSPECIFIED CAUSE, UNSPECIFIED CHRONICITY, UNSPECIFIED SITE: Primary | ICD-10-CM

## 2025-02-20 PROCEDURE — 99213 OFFICE O/P EST LOW 20 MIN: CPT | Performed by: FAMILY MEDICINE

## 2025-02-20 PROCEDURE — 1159F MED LIST DOCD IN RCRD: CPT | Performed by: FAMILY MEDICINE

## 2025-02-20 PROCEDURE — 1036F TOBACCO NON-USER: CPT | Performed by: FAMILY MEDICINE

## 2025-02-20 PROCEDURE — 1160F RVW MEDS BY RX/DR IN RCRD: CPT | Performed by: FAMILY MEDICINE

## 2025-02-20 PROCEDURE — 3008F BODY MASS INDEX DOCD: CPT | Performed by: FAMILY MEDICINE

## 2025-02-20 RX ORDER — INDOMETHACIN 25 MG/1
25 CAPSULE ORAL 3 TIMES DAILY
Qty: 30 CAPSULE | Refills: 0 | Status: SHIPPED | OUTPATIENT
Start: 2025-02-20

## 2025-02-20 RX ORDER — ALLOPURINOL 100 MG/1
100 TABLET ORAL DAILY
Qty: 90 TABLET | Refills: 2 | Status: SHIPPED | OUTPATIENT
Start: 2025-02-20 | End: 2026-02-20

## 2025-02-20 RX ORDER — ROSUVASTATIN CALCIUM 5 MG/1
5 TABLET, COATED ORAL DAILY
Qty: 90 TABLET | Refills: 2 | Status: SHIPPED | OUTPATIENT
Start: 2025-02-20 | End: 2026-02-20

## 2025-02-20 NOTE — PROGRESS NOTES
"Subjective   Patient ID: Rioc Peng is a 64 y.o. male who presents for Virtual Visit.    Assessment/Plan     Problem List Items Addressed This Visit       Gout - Primary    Relevant Medications    indomethacin (Indocin) 25 mg capsule    allopurinol (Zyloprim) 100 mg tablet    Hyperuricemia    Relevant Medications    allopurinol (Zyloprim) 100 mg tablet    Mixed hyperlipidemia    Relevant Medications    rosuvastatin (Crestor) 5 mg tablet       Prevnar 20 today  discussed about Diet exercise  Colonoscopy declined -Cologuard negative in December 2018  Cologuard today  Discussed about shingles vaccine  Follow-up every year for physical. Come back early with any new sinus symptoms. Patient understands and in agreement with plan.       HPI    64-year-old male had a telephone call today regarding follow-up on lab work    Uric acid 9.9    TSH 4.9  Gout on colchicine    No significant past medical history surgical history no smoking alcohol or drug use    Doing routine exercise without any trouble    Discussed in detail about diet exercise  Consider starting patient on allopurinol and rosuvastatin  Risk-benefit discussed  Telephone call lasted anywhere between 10 to 15 minutes    No Known Allergies    Current Outpatient Medications   Medication Sig Dispense Refill   • cholecalciferol (Vitamin D-3) 125 mcg (5000 UT) capsule Take by mouth.     • colchicine 0.6 mg tablet Take 1 tablet (0.6 mg) by mouth once daily. 90 tablet 3   • melatonin 3 mg capsule Take by mouth.     • allopurinol (Zyloprim) 100 mg tablet Take 1 tablet (100 mg) by mouth once daily. 90 tablet 2   • indomethacin (Indocin) 25 mg capsule Take 1 capsule (25 mg) by mouth 3 times a day. 30 capsule 0   • rosuvastatin (Crestor) 5 mg tablet Take 1 tablet (5 mg) by mouth once daily. 90 tablet 2     No current facility-administered medications for this visit.       Objective   Visit Vitals  Ht 1.778 m (5' 10\")   Wt 74.8 kg (165 lb)   BMI 23.68 kg/m² "   Smoking Status Never   BSA 1.92 m²     Physical Exam      Immunization History   Administered Date(s) Administered   • COVID-19, mRNA, LNP-S, PF, 30 mcg/0.3 mL dose 04/02/2021, 04/23/2021   • Pfizer COVID-19 vaccine, bivalent, age 12 years and older (30 mcg/0.3 mL) 09/15/2022   • Pneumococcal conjugate vaccine, 20-valent (PREVNAR 20) 02/07/2025       Review of Systems    Office Visit on 02/07/2025   Component Date Value Ref Range Status   • PSA, TOTAL 02/07/2025 0.48  < OR = 4.00 ng/mL Final   • TSH W/REFLEX TO FT4 02/07/2025 4.93 (H)  0.40 - 4.50 mIU/L Final   • T4, FREE 02/07/2025 1.1  0.8 - 1.8 ng/dL Final   • CHOLESTEROL, TOTAL 02/07/2025 248 (H)  <200 mg/dL Final   • HDL CHOLESTEROL 02/07/2025 49  > OR = 40 mg/dL Final   • TRIGLYCERIDES 02/07/2025 141  <150 mg/dL Final   • LDL-CHOLESTEROL 02/07/2025 171 (H)  mg/dL (calc) Final   • CHOL/HDLC RATIO 02/07/2025 5.1 (H)  <5.0 (calc) Final   • NON HDL CHOLESTEROL 02/07/2025 199 (H)  <130 mg/dL (calc) Final   • GLUCOSE 02/07/2025 94  65 - 99 mg/dL Final   • UREA NITROGEN (BUN) 02/07/2025 19  7 - 25 mg/dL Final   • CREATININE 02/07/2025 1.28  0.70 - 1.35 mg/dL Final   • EGFR 02/07/2025 62  > OR = 60 mL/min/1.73m2 Final   • SODIUM 02/07/2025 141  135 - 146 mmol/L Final   • POTASSIUM 02/07/2025 4.3  3.5 - 5.3 mmol/L Final   • CHLORIDE 02/07/2025 104  98 - 110 mmol/L Final   • CARBON DIOXIDE 02/07/2025 30  20 - 32 mmol/L Final   • ELECTROLYTE BALANCE 02/07/2025 7  7 - 17 mmol/L (calc) Final   • CALCIUM 02/07/2025 9.7  8.6 - 10.3 mg/dL Final   • PROTEIN, TOTAL 02/07/2025 6.7  6.1 - 8.1 g/dL Final   • ALBUMIN 02/07/2025 4.6  3.6 - 5.1 g/dL Final   • BILIRUBIN, TOTAL 02/07/2025 0.9  0.2 - 1.2 mg/dL Final   • ALKALINE PHOSPHATASE 02/07/2025 59  35 - 144 U/L Final   • AST 02/07/2025 23  10 - 35 U/L Final   • ALT 02/07/2025 22  9 - 46 U/L Final   • WHITE BLOOD CELL COUNT 02/07/2025 6.7  3.8 - 10.8 Thousand/uL Final   • RED BLOOD CELL COUNT 02/07/2025 5.39  4.20 - 5.80  Million/uL Final   • HEMOGLOBIN 02/07/2025 17.1  13.2 - 17.1 g/dL Final   • HEMATOCRIT 02/07/2025 48.7  38.5 - 50.0 % Final   • MCV 02/07/2025 90.4  80.0 - 100.0 fL Final   • MCH 02/07/2025 31.7  27.0 - 33.0 pg Final   • MCHC 02/07/2025 35.1  32.0 - 36.0 g/dL Final   • RDW 02/07/2025 12.4  11.0 - 15.0 % Final   • PLATELET COUNT 02/07/2025 229  140 - 400 Thousand/uL Final   • MPV 02/07/2025 11.2  7.5 - 12.5 fL Final   • COLOR 02/07/2025 YELLOW  YELLOW Final   • APPEARANCE 02/07/2025 CLEAR  CLEAR Final   • SPECIFIC GRAVITY 02/07/2025 1.015  1.001 - 1.035 Final   • PH 02/07/2025 6.5  5.0 - 8.0 Final   • GLUCOSE 02/07/2025 NEGATIVE  NEGATIVE Final   • BILIRUBIN 02/07/2025 NEGATIVE  NEGATIVE Final   • KETONES 02/07/2025 NEGATIVE  NEGATIVE Final   • OCCULT BLOOD 02/07/2025 NEGATIVE  NEGATIVE Final   • PROTEIN 02/07/2025 NEGATIVE  NEGATIVE Final   • NITRITE 02/07/2025 NEGATIVE  NEGATIVE Final   • LEUKOCYTE ESTERASE 02/07/2025 NEGATIVE  NEGATIVE Final   • URIC ACID 02/07/2025 9.9 (H)  4.0 - 8.0 mg/dL Final       Radiology: Reviewed imaging in powerchart.  No results found.    No family history on file.  Social History     Socioeconomic History   • Marital status:    Tobacco Use   • Smoking status: Never   • Smokeless tobacco: Never   Substance and Sexual Activity   • Alcohol use: Never   • Drug use: Never     Past Medical History:   Diagnosis Date   • Other conditions influencing health status     No significant past surgical history   • Other specified soft tissue disorders 08/19/2015    Foot swelling   • Pain in unspecified foot 08/19/2015    Foot pain     History reviewed. No pertinent surgical history.    Charting was completed using voice recognition technology and may include unintended errors.

## 2025-04-01 ENCOUNTER — TELEPHONE (OUTPATIENT)
Dept: PRIMARY CARE | Facility: CLINIC | Age: 65
End: 2025-04-01
Payer: COMMERCIAL

## 2025-05-07 DIAGNOSIS — M10.9 GOUT, UNSPECIFIED CAUSE, UNSPECIFIED CHRONICITY, UNSPECIFIED SITE: ICD-10-CM

## 2025-05-09 RX ORDER — INDOMETHACIN 25 MG/1
CAPSULE ORAL
Qty: 30 CAPSULE | Refills: 0 | Status: SHIPPED | OUTPATIENT
Start: 2025-05-09

## 2025-09-04 DIAGNOSIS — E79.0 HYPERURICEMIA: ICD-10-CM

## 2025-09-04 DIAGNOSIS — M10.9 GOUT, UNSPECIFIED CAUSE, UNSPECIFIED CHRONICITY, UNSPECIFIED SITE: ICD-10-CM

## 2025-09-04 RX ORDER — ALLOPURINOL 100 MG/1
100 TABLET ORAL DAILY
Qty: 90 TABLET | Refills: 2 | Status: SHIPPED | OUTPATIENT
Start: 2025-09-04